# Patient Record
Sex: FEMALE | Race: WHITE | NOT HISPANIC OR LATINO | Employment: FULL TIME | URBAN - METROPOLITAN AREA
[De-identification: names, ages, dates, MRNs, and addresses within clinical notes are randomized per-mention and may not be internally consistent; named-entity substitution may affect disease eponyms.]

---

## 2024-02-28 ENCOUNTER — OFFICE VISIT (OUTPATIENT)
Dept: RADIATION ONCOLOGY | Facility: CLINIC | Age: 59
End: 2024-02-28
Payer: COMMERCIAL

## 2024-02-28 VITALS
DIASTOLIC BLOOD PRESSURE: 74 MMHG | WEIGHT: 164.44 LBS | SYSTOLIC BLOOD PRESSURE: 110 MMHG | RESPIRATION RATE: 18 BRPM | BODY MASS INDEX: 28.07 KG/M2 | HEART RATE: 75 BPM | TEMPERATURE: 98 F | OXYGEN SATURATION: 99 % | HEIGHT: 64 IN

## 2024-02-28 DIAGNOSIS — C79.49 SECONDARY MALIGNANT NEOPLASM OF BRAIN AND SPINAL CORD: Primary | ICD-10-CM

## 2024-02-28 DIAGNOSIS — C50.811 CANCER OF OVERLAPPING SITES OF RIGHT BREAST: ICD-10-CM

## 2024-02-28 DIAGNOSIS — C79.31 SECONDARY MALIGNANT NEOPLASM OF BRAIN AND SPINAL CORD: Primary | ICD-10-CM

## 2024-02-28 PROCEDURE — 99999 PR PBB SHADOW E&M-EST. PATIENT-LVL IV: CPT | Mod: PBBFAC,,, | Performed by: SPECIALIST

## 2024-02-28 PROCEDURE — 1159F MED LIST DOCD IN RCRD: CPT | Mod: CPTII,S$GLB,, | Performed by: SPECIALIST

## 2024-02-28 PROCEDURE — 3078F DIAST BP <80 MM HG: CPT | Mod: CPTII,S$GLB,, | Performed by: SPECIALIST

## 2024-02-28 PROCEDURE — 99205 OFFICE O/P NEW HI 60 MIN: CPT | Mod: S$GLB,,, | Performed by: SPECIALIST

## 2024-02-28 PROCEDURE — 3074F SYST BP LT 130 MM HG: CPT | Mod: CPTII,S$GLB,, | Performed by: SPECIALIST

## 2024-02-28 PROCEDURE — 3008F BODY MASS INDEX DOCD: CPT | Mod: CPTII,S$GLB,, | Performed by: SPECIALIST

## 2024-02-28 RX ORDER — DENOSUMAB 120 MG/1.7ML
120 INJECTION SUBCUTANEOUS
COMMUNITY

## 2024-02-28 RX ORDER — PREDNISONE 20 MG/1
20 TABLET ORAL EVERY MORNING
COMMUNITY
Start: 2023-11-08

## 2024-02-28 RX ORDER — PANTOPRAZOLE SODIUM 40 MG/1
40 TABLET, DELAYED RELEASE ORAL DAILY
COMMUNITY

## 2024-02-28 RX ORDER — ACETAMINOPHEN 500 MG
500 TABLET ORAL DAILY PRN
COMMUNITY

## 2024-02-28 RX ORDER — TEMAZEPAM 7.5 MG/1
7.5 CAPSULE ORAL NIGHTLY
COMMUNITY

## 2024-02-28 RX ORDER — FAM-TRASTUZUMAB DERUXTECAN-NXKI 100 MG/5ML
5.4 INJECTION, POWDER, LYOPHILIZED, FOR SOLUTION INTRAVENOUS
COMMUNITY

## 2024-02-28 RX ORDER — PROMETHAZINE HYDROCHLORIDE 12.5 MG/1
12.5 TABLET ORAL EVERY 6 HOURS PRN
COMMUNITY
Start: 2023-10-12

## 2024-02-28 NOTE — PROGRESS NOTES
[facility] / [facility] - Radiation Oncology Consult Note    Requesting physician:  Kim Abraham MD      HISTORY OF PRESENT ILLNESS:  Now 58-year-old woman, well known to me, with a history of right-sided breast cancer diagnosed in 2016, ultimately became widely metastatic and was controlled for years with systemic therapy, presenting to me with modest progression of her pulmonary nodules and development of a solitary cystic right-sided cerebellar metastasis that underwent gross total resection followed by fractionated stereotactic radiotherapy to 30 Gy in 5 fractions last on 04/12/2022.    Herceptin and Perjeta were ongoing with Dr. Abraham through our last follow up on 05/18/2022 when she remained entirely free of neurologic complaints or findings    I have requested interval records including all Radiology, but today she reports most recent restaging exam showed 2 new punctate foci in the brain, from which he is entirely asymptomatic, and some subtle progression in her subcentimeter thoracic disease.  Some manner of HER2 Beena therapy is ongoing, though Perjeta is no longer on board.  She reports tolerating her therapies without unexpected difficulty.  She is otherwise entirely without focal complaints      REVIEW OF SYSTEMS:  See above.  No focal complaints on multisystem review    PAST MEDICAL HISTORY:  Past Medical History:   Diagnosis Date    Anemia     Bone cancer     Brain cancer     Breast cancer     Depression     Lung cancer     Thyroid disease        PAST SURGICAL HISTORY:  Past Surgical History:   Procedure Laterality Date    BREAST LUMPECTOMY  2017    right breast    CRANIOTOMY FOR EXCISION OF INTRACRANIAL TUMOR  2022       ALLERGIES:   Review of patient's allergies indicates:   Allergen Reactions    Hydrocodone Nausea Only     Severe nausea    Codeine Nausea Only       MEDICATIONS:  Current Outpatient Medications   Medication Sig    acetaminophen (TYLENOL) 500 MG tablet  "Take 500 mg by mouth daily as needed.    denosumab (XGEVA) 120 mg/1.7 mL (70 mg/mL) Soln Inject 120 mg into the skin.    fam-trastuzumab deruxtecan-nxki (ENHERTU) 100 mg SolR Inject 5.4 mg/kg into the vein.    pantoprazole (PROTONIX) 40 MG tablet Take 40 mg by mouth once daily.    predniSONE (DELTASONE) 20 MG tablet Take 20 mg by mouth every morning.    promethazine (PHENERGAN) 12.5 MG Tab Take 12.5 mg by mouth every 6 (six) hours as needed.    temazepam (RESTORIL) 7.5 MG Cap Take 7.5 mg by mouth every evening.     No current facility-administered medications for this visit.       SOCIAL HISTORY:  Social History     Socioeconomic History    Marital status: Single   Tobacco Use    Smoking status: Never    Smokeless tobacco: Never   Substance and Sexual Activity    Alcohol use: Yes     Comment: occasionally       FAMILY HISTORY:  Family History   Problem Relation Age of Onset    Cancer Father     Breast cancer Paternal Grandmother     Breast cancer Paternal Great-Grandmother          PHYSICAL EXAMINATION:  Vitals:    02/28/24 0958   BP: 110/74   Pulse: 75   Resp: 18   Temp: 98 °F (36.7 °C)   SpO2: 99%   Weight: 74.6 kg (164 lb 7.4 oz)   Height: 5' 4" (1.626 m)     General:  A&O x4, NAD   Head and neck:  PERRLA, EOM intact, cranial nerves 2-12 intact   Neuro: Muscle strength is symmetric and appropriate throughout.  Deep tendon reflexes are symmetric and appropriate throughout.  She has normal rapid alternating hand movements and finger-nose with a negative Romberg and normal gait  Lymphatics: No cervical or supraclavicular adenopathy  Thoracic:  CTAB, RRR     KPS:  90    ASSESSMENT:  Longstanding history of metastatic breast cancer controlled with systemic therapy, with the exception of a solitary brain metastasis that underwent resection and fractionated adjuvant radiotherapy last on 04/12/2022.  Now with possible subtle systemic progression and MRI evidence of 2 new punctate foci in the brain, asymptomatic.  She " maintains an excellent performance status    PLAN:  I will obtain all of her interval records including films.  Assuming history is accurate and there are a pair of new brain metastases not in close proximity to her prior treatment, she is best treated with stereotactic radiosurgery.  We reviewed the logistics of that treatment, including the planning and treatment visits, as well as possible acute and chronic side effects, including increased risk if there is any overlap with her prior brain treatment.  She voiced an understanding and a desire to proceed.  Informed written consent was obtained and she was given the original after scanning into the EMR.    She will return next week after I have had opportunity to review her films.  She will undergo stereotactic immobilization and CT simulation and begin treatment about 1 week later, with final dose and fractionation to be determined following radiographic review, likely 27/3/9.0 for each    Addendum to 09/20/2024 :  I have reviewed her MRI from 02/20/2024 and compared it to her scan from July 2023 though contrasted sagittal images were not sent over by the General for either of the most recent prior MRIs, and the abnormalities are best visualized on those films..  I agree with the written report that there are 2 new right cerebellar enhancing nodules worrisome for recurrence.  A left nicholas hemangioma has remained stable over time.  I am unable to attach relevant images    I have also reviewed CT of the chest abdomen and pelvis on 02/20/2024 and agree that there is subtle enlargement of a 4 mm left lower lobe pulmonary nodule up to 6 mm, with the remaining pulmonary nodules stable and no new nodules.  Stable posterior left renal enhancing mass      I spent approximately 60 minutes reviewing the available records and evaluating the patient, out of which over 50% of the time was spent face to face with the patient in counseling and coordinating this patient's care.

## 2024-02-29 ENCOUNTER — DOCUMENTATION ONLY (OUTPATIENT)
Dept: RADIATION ONCOLOGY | Facility: CLINIC | Age: 59
End: 2024-02-29
Payer: COMMERCIAL

## 2024-03-01 ENCOUNTER — HOSPITAL ENCOUNTER (OUTPATIENT)
Dept: RADIATION THERAPY | Facility: HOSPITAL | Age: 59
Discharge: HOME | End: 2024-03-01
Attending: SPECIALIST
Payer: COMMERCIAL

## 2024-03-06 ENCOUNTER — HOSPITAL ENCOUNTER (OUTPATIENT)
Dept: RADIATION THERAPY | Facility: HOSPITAL | Age: 59
Discharge: HOME | End: 2024-03-06
Attending: SPECIALIST
Payer: COMMERCIAL

## 2024-03-06 ENCOUNTER — HOSPITAL ENCOUNTER (OUTPATIENT)
Dept: RADIOLOGY | Facility: HOSPITAL | Age: 59
Discharge: HOME | End: 2024-03-06
Attending: SPECIALIST
Payer: COMMERCIAL

## 2024-03-06 DIAGNOSIS — C79.49 SECONDARY MALIGNANT NEOPLASM OF BRAIN AND SPINAL CORD: Primary | ICD-10-CM

## 2024-03-06 DIAGNOSIS — C79.31 SECONDARY MALIGNANT NEOPLASM OF BRAIN AND SPINAL CORD: Primary | ICD-10-CM

## 2024-03-06 PROCEDURE — 77334 RADIATION TREATMENT AID(S): CPT | Mod: TC | Performed by: SPECIALIST

## 2024-03-06 PROCEDURE — 77263 THER RADIOLOGY TX PLNG CPLX: CPT | Mod: ,,, | Performed by: SPECIALIST

## 2024-03-06 PROCEDURE — 77014 PR  CT GUIDANCE PLACEMENT RAD THERAPY FIELDS: CPT | Mod: 26,,, | Performed by: SPECIALIST

## 2024-03-06 PROCEDURE — 77334 RADIATION TREATMENT AID(S): CPT | Mod: 26,,, | Performed by: SPECIALIST

## 2024-03-06 PROCEDURE — 77014 HC CT GUIDANCE RADIATION THERAPY FLDS PLACEMENT: CPT | Mod: TC | Performed by: SPECIALIST

## 2024-03-12 ENCOUNTER — HOSPITAL ENCOUNTER (OUTPATIENT)
Dept: RADIOLOGY | Facility: HOSPITAL | Age: 59
Discharge: HOME | End: 2024-03-12
Attending: SPECIALIST
Payer: COMMERCIAL

## 2024-03-12 DIAGNOSIS — C79.31 SECONDARY MALIGNANT NEOPLASM OF BRAIN AND SPINAL CORD: ICD-10-CM

## 2024-03-12 DIAGNOSIS — C79.49 SECONDARY MALIGNANT NEOPLASM OF BRAIN AND SPINAL CORD: ICD-10-CM

## 2024-03-12 PROCEDURE — 70553 MRI BRAIN STEM W/O & W/DYE: CPT | Mod: TC

## 2024-03-12 PROCEDURE — 25500020 PHARM REV CODE 255: Performed by: SPECIALIST

## 2024-03-12 PROCEDURE — 70553 MRI BRAIN STEM W/O & W/DYE: CPT | Mod: 26,,, | Performed by: RADIOLOGY

## 2024-03-12 PROCEDURE — A9585 GADOBUTROL INJECTION: HCPCS | Performed by: SPECIALIST

## 2024-03-12 RX ORDER — GADOBUTROL 604.72 MG/ML
10 INJECTION INTRAVENOUS
Status: COMPLETED | OUTPATIENT
Start: 2024-03-12 | End: 2024-03-12

## 2024-03-12 RX ADMIN — GADOBUTROL 7 ML: 604.72 INJECTION INTRAVENOUS at 06:03

## 2024-03-14 PROCEDURE — 77301 RADIOTHERAPY DOSE PLAN IMRT: CPT | Mod: 26,,, | Performed by: SPECIALIST

## 2024-03-14 PROCEDURE — 77301 RADIOTHERAPY DOSE PLAN IMRT: CPT | Mod: TC | Performed by: SPECIALIST

## 2024-03-18 PROCEDURE — 77370 RADIATION PHYSICS CONSULT: CPT | Performed by: SPECIALIST

## 2024-03-19 PROCEDURE — 77470 SPECIAL RADIATION TREATMENT: CPT | Mod: 59,TC | Performed by: SPECIALIST

## 2024-03-19 PROCEDURE — 77338 DESIGN MLC DEVICE FOR IMRT: CPT | Mod: 26,,, | Performed by: SPECIALIST

## 2024-03-19 PROCEDURE — 77300 RADIATION THERAPY DOSE PLAN: CPT | Mod: TC | Performed by: SPECIALIST

## 2024-03-19 PROCEDURE — 77370 RADIATION PHYSICS CONSULT: CPT | Performed by: SPECIALIST

## 2024-03-19 PROCEDURE — 77300 RADIATION THERAPY DOSE PLAN: CPT | Mod: 26,,, | Performed by: SPECIALIST

## 2024-03-19 PROCEDURE — 77338 DESIGN MLC DEVICE FOR IMRT: CPT | Mod: TC | Performed by: SPECIALIST

## 2024-03-19 PROCEDURE — 77470 SPECIAL RADIATION TREATMENT: CPT | Mod: 26,59,, | Performed by: SPECIALIST

## 2024-03-20 ENCOUNTER — DOCUMENTATION ONLY (OUTPATIENT)
Dept: RADIATION ONCOLOGY | Facility: CLINIC | Age: 59
End: 2024-03-20
Payer: COMMERCIAL

## 2024-03-20 PROCEDURE — 77014 HC CT GUIDANCE RADIATION THERAPY FLDS PLACEMENT: CPT | Mod: TC | Performed by: SPECIALIST

## 2024-03-20 PROCEDURE — 77435 SBRT MANAGEMENT: CPT | Mod: ,,, | Performed by: SPECIALIST

## 2024-03-20 PROCEDURE — 77014 PR  CT GUIDANCE PLACEMENT RAD THERAPY FIELDS: CPT | Mod: 26,,, | Performed by: SPECIALIST

## 2024-03-20 PROCEDURE — 77373 STRTCTC BDY RAD THER TX DLVR: CPT | Performed by: SPECIALIST

## 2024-03-20 NOTE — PLAN OF CARE
Day 1 outpatient xrt to the brain. Brain handout and verbal instructions given. Skin care and side effects reviewed. Contact info provided. Patient verbalized understanding.

## 2024-03-21 ENCOUNTER — DOCUMENTATION ONLY (OUTPATIENT)
Dept: RADIATION ONCOLOGY | Facility: CLINIC | Age: 59
End: 2024-03-21
Payer: COMMERCIAL

## 2024-03-21 PROCEDURE — 77373 STRTCTC BDY RAD THER TX DLVR: CPT | Performed by: SPECIALIST

## 2024-03-21 PROCEDURE — 77014 PR  CT GUIDANCE PLACEMENT RAD THERAPY FIELDS: CPT | Mod: 26,,, | Performed by: SPECIALIST

## 2024-03-21 NOTE — PLAN OF CARE
Day 2 outpatient xrt to the brain. Tolerating therapy well without complaint at this time. Will continue to monitor.

## 2024-03-22 ENCOUNTER — DOCUMENTATION ONLY (OUTPATIENT)
Dept: RADIATION ONCOLOGY | Facility: CLINIC | Age: 59
End: 2024-03-22
Payer: COMMERCIAL

## 2024-03-22 PROCEDURE — 77373 STRTCTC BDY RAD THER TX DLVR: CPT | Performed by: SPECIALIST

## 2024-03-22 PROCEDURE — 77014 PR  CT GUIDANCE PLACEMENT RAD THERAPY FIELDS: CPT | Mod: 26,,, | Performed by: SPECIALIST

## 2024-03-26 PROCEDURE — 77336 RADIATION PHYSICS CONSULT: CPT | Performed by: SPECIALIST

## 2024-04-25 ENCOUNTER — TELEPHONE (OUTPATIENT)
Dept: RADIATION ONCOLOGY | Facility: CLINIC | Age: 59
End: 2024-04-25
Payer: COMMERCIAL

## 2024-04-25 NOTE — TELEPHONE ENCOUNTER
Made call to r/s cancelled f/u appt with Dr Ayala in radiation Oncology. No answer, LVM and call back number.

## 2024-05-16 ENCOUNTER — TELEPHONE (OUTPATIENT)
Dept: RADIATION ONCOLOGY | Facility: CLINIC | Age: 59
End: 2024-05-16
Payer: COMMERCIAL

## 2024-05-17 ENCOUNTER — TELEPHONE (OUTPATIENT)
Dept: RADIATION ONCOLOGY | Facility: CLINIC | Age: 59
End: 2024-05-17
Payer: COMMERCIAL

## 2024-05-17 NOTE — TELEPHONE ENCOUNTER
Tried calling patient again to r/s missed f/u appt. No answer, LVM and call back number on both numbers listed in chart.

## 2024-05-22 ENCOUNTER — OFFICE VISIT (OUTPATIENT)
Dept: RADIATION ONCOLOGY | Facility: CLINIC | Age: 59
End: 2024-05-22
Payer: COMMERCIAL

## 2024-05-22 VITALS
DIASTOLIC BLOOD PRESSURE: 58 MMHG | SYSTOLIC BLOOD PRESSURE: 110 MMHG | TEMPERATURE: 97 F | BODY MASS INDEX: 28.23 KG/M2 | OXYGEN SATURATION: 99 % | HEART RATE: 69 BPM | WEIGHT: 164.44 LBS | RESPIRATION RATE: 18 BRPM

## 2024-05-22 DIAGNOSIS — C50.811 CANCER OF OVERLAPPING SITES OF RIGHT BREAST: ICD-10-CM

## 2024-05-22 DIAGNOSIS — C79.49 SECONDARY MALIGNANT NEOPLASM OF BRAIN AND SPINAL CORD: Primary | ICD-10-CM

## 2024-05-22 DIAGNOSIS — C79.31 SECONDARY MALIGNANT NEOPLASM OF BRAIN AND SPINAL CORD: Primary | ICD-10-CM

## 2024-05-22 PROCEDURE — 99213 OFFICE O/P EST LOW 20 MIN: CPT | Mod: S$GLB,,, | Performed by: SPECIALIST

## 2024-05-22 PROCEDURE — 3074F SYST BP LT 130 MM HG: CPT | Mod: CPTII,S$GLB,, | Performed by: SPECIALIST

## 2024-05-22 PROCEDURE — 3008F BODY MASS INDEX DOCD: CPT | Mod: CPTII,S$GLB,, | Performed by: SPECIALIST

## 2024-05-22 PROCEDURE — 99999 PR PBB SHADOW E&M-EST. PATIENT-LVL IV: CPT | Mod: PBBFAC,,, | Performed by: SPECIALIST

## 2024-05-22 PROCEDURE — 3078F DIAST BP <80 MM HG: CPT | Mod: CPTII,S$GLB,, | Performed by: SPECIALIST

## 2024-05-22 PROCEDURE — 1159F MED LIST DOCD IN RCRD: CPT | Mod: CPTII,S$GLB,, | Performed by: SPECIALIST

## 2024-05-22 RX ORDER — LEVOTHYROXINE SODIUM 25 UG/1
25 TABLET ORAL
COMMUNITY
Start: 2024-04-06

## 2024-05-22 NOTE — PROGRESS NOTES
[facility] / [facility] - Radiation Oncology Follow Up Note       REFERRING PHYSICIAN: : Kim Abraham MD     DIAGNOSIS: C79.31 - Secondary malignant neoplasm of brain, Diagnosed 2/28/2024 (Active)     Longstanding history of metastatic breast cancer controlled with systemic therapy, with the exception of a solitary brain metastasis that underwent resection and fractionated adjuvant radiotherapy last on 04/12/2022.     She then developed 3 new right cerebellar asymptomatic enhancing metastases. She maintained an excellent performance status.      All 3 lesions were treated using a single isodose plan delivering 27 Gy in 3 9.0 Gy fractions using image guided VMAT stereotactic techniques from 03/20/2024 to 03/22/2024          INTERVAL HISTORY:  She returns for routine follow up after having missed her short interval one-month follow-up.  She remains entirely without complaints related to her therapy or intracranial disease.  She has no other new complaints.    She underwent brain MRI on 05/13/2024 which I have reviewed today.  There are no new findings.  The treated lesions, as shown, are stable to modestly more prominent.  The report describes the 1st lesion below as new, but it was compared to a non-stealth MRI from 02/20/2024.  This lesion was 1 of the 3 that were recently treated.        5/13/24     3/12/24                  She also underwent CT of the chest abdomen and pelvis, by report, with persistent freedom from subdiaphragmatic disease.  She has multiple subcentimeter bilateral pulmonary nodules that appear stable, with the exception of a single left upper lobe nodule increasing from 4 up to 8 mm.  There were no new findings    EnHERtu is underway with Dr. Abraham    PHYSICAL EXAMINATION:  Vitals:    05/22/24 0846   BP: (!) 110/58   Pulse: 69   Resp: 18   Temp: 97.3 °F (36.3 °C)   SpO2: 99%   Weight: 74.6 kg (164 lb 7.4 oz)      General:  A&O x4, NAD  HEENT:  SERENITY YOU II-XII  intact, EOM intact,   Neuro:  Muscle strength is symmetric and appropriate throughout.  Deep tendon reflexes are symmetric and appropriate throughout.  She has a normal gait  Lymphatics:  no cervical/sclav LAD  Lungs:  CTAB  Heart:  RRR  Abdomen:  NTND       ASSESSMENT:  Clinically stable.  Questionable progression of 2 of her recently treated trio of lesions versus posttherapeutic change versus radionecrosis.      PLAN:  I recommend continued observation.  I have ordered a stealth MRI in 2 months She maintains frequent follow up with Dr. Abraham where radiographic monitoring is otherwise performed

## 2024-07-24 ENCOUNTER — HOSPITAL ENCOUNTER (OUTPATIENT)
Dept: RADIOLOGY | Facility: HOSPITAL | Age: 59
Discharge: HOME | End: 2024-07-24
Attending: SPECIALIST
Payer: COMMERCIAL

## 2024-07-24 DIAGNOSIS — C50.811 CANCER OF OVERLAPPING SITES OF RIGHT BREAST: ICD-10-CM

## 2024-07-24 DIAGNOSIS — C79.31 SECONDARY MALIGNANT NEOPLASM OF BRAIN AND SPINAL CORD: ICD-10-CM

## 2024-07-24 DIAGNOSIS — C79.49 SECONDARY MALIGNANT NEOPLASM OF BRAIN AND SPINAL CORD: ICD-10-CM

## 2024-07-24 PROCEDURE — 25500020 PHARM REV CODE 255: Performed by: SPECIALIST

## 2024-07-24 PROCEDURE — A9585 GADOBUTROL INJECTION: HCPCS | Performed by: SPECIALIST

## 2024-07-24 PROCEDURE — 70552 MRI BRAIN STEM W/DYE: CPT | Mod: 26,,, | Performed by: RADIOLOGY

## 2024-07-24 PROCEDURE — 70552 MRI BRAIN STEM W/DYE: CPT | Mod: TC

## 2024-07-24 RX ORDER — GADOBUTROL 604.72 MG/ML
10 INJECTION INTRAVENOUS
Status: COMPLETED | OUTPATIENT
Start: 2024-07-24 | End: 2024-07-24

## 2024-07-24 RX ADMIN — GADOBUTROL 7 ML: 604.72 INJECTION INTRAVENOUS at 11:07

## 2024-07-31 ENCOUNTER — OFFICE VISIT (OUTPATIENT)
Dept: RADIATION ONCOLOGY | Facility: CLINIC | Age: 59
End: 2024-07-31
Payer: COMMERCIAL

## 2024-07-31 VITALS
HEIGHT: 64 IN | HEART RATE: 80 BPM | WEIGHT: 163.38 LBS | TEMPERATURE: 97 F | SYSTOLIC BLOOD PRESSURE: 93 MMHG | BODY MASS INDEX: 27.89 KG/M2 | RESPIRATION RATE: 18 BRPM | OXYGEN SATURATION: 99 % | DIASTOLIC BLOOD PRESSURE: 61 MMHG

## 2024-07-31 DIAGNOSIS — C79.31 SECONDARY MALIGNANT NEOPLASM OF BRAIN AND SPINAL CORD: Primary | ICD-10-CM

## 2024-07-31 DIAGNOSIS — C50.811 CANCER OF OVERLAPPING SITES OF RIGHT BREAST: ICD-10-CM

## 2024-07-31 DIAGNOSIS — C79.49 SECONDARY MALIGNANT NEOPLASM OF BRAIN AND SPINAL CORD: Primary | ICD-10-CM

## 2024-07-31 PROCEDURE — 99999 PR PBB SHADOW E&M-EST. PATIENT-LVL IV: CPT | Mod: PBBFAC,,, | Performed by: SPECIALIST

## 2024-07-31 RX ORDER — AZELASTINE 1 MG/ML
1 SPRAY, METERED NASAL
COMMUNITY
Start: 2024-06-24

## 2024-07-31 NOTE — PROGRESS NOTES
[facility] - Radiation Oncology Follow Up Note    REFERRING PHYSICIAN: : Kim Abraham MD      DIAGNOSIS: C79.31 - Secondary malignant neoplasm of brain, Diagnosed 2/28/2024 (Active)      Longstanding history of metastatic breast cancer controlled with systemic therapy, with the exception of a solitary brain metastasis that underwent resection and fractionated adjuvant radiotherapy last on 04/12/2022.      She then developed 3 new right cerebellar asymptomatic enhancing metastases. She maintained an excellent performance status.       All 3 lesions were treated using a single isodose plan delivering 27 Gy in 3 9.0 Gy fractions using image guided VMAT stereotactic techniques from 03/20/2024 to 03/22/2024        MRI on 05/13/2024 no new findings.  The treated lesions, as shown, are stable to modestly more prominent.  The report describes the 1st lesion below as new, but it was compared to a non-stealth MRI from 02/20/2024.  This lesion was 1 of the 3 that were recently treated.                                5/13/24                                                            3/12/24                       She also underwent CT of the chest abdomen and pelvis, by report, with persistent freedom from subdiaphragmatic disease.  She has multiple subcentimeter bilateral pulmonary nodules that appear stable, with the exception of a single left upper lobe nodule increasing from 4 up to 8 mm.  There were no new findings     EnHERtu is underway with Dr. Abraham    INTERVAL HISTORY:  She returns for routine three-month follow up.  She has no new complaints related to therapy are worrisome for local regional or metastatic recurrence.  She describes occasional headache not requiring intervention.  She specifically denies ataxia, focal motor or sensory deficits, or vision change.  She continues to work full-time without deficits    MRI on 07/24/2024, which I have reviewed, by report shows  "increasing vasogenic edema and increased size of all 3 previously treated lesions now measuring 0.88 by 1.21 cm, 0.7 cm, and 0.49 cm..  On my review, I am pleased to see that the edema is not very impressive, consistent with her review of systems.  As seen below, 2 of the 3 treated lesions have shown modest change over time, consistent with post radiotherapeutic change.  However the 1st lesion shown does demonstrate a more concerning change, most consistent with radiation necrosis.      07/24/2024 05/13/2024 03/12/2024, pretreatment                  She does report recent restaging at the [facility] demonstrates progressive thoracic disease.  She currently remains on in enHERtu        PHYSICAL EXAMINATION:  Vitals:    07/31/24 0952   BP: 93/61   Pulse: 80   Resp: 18   Temp: 97.2 °F (36.2 °C)   SpO2: 99%   Weight: 74.1 kg (163 lb 5.8 oz)   Height: 5' 4" (1.626 m)      General:  A&O x4, NAD, she nimbly scales exam table without assistance  HEENT:  PEERLA, CN II-XII intact, EOM intact,   Neuro:  Muscle strength is symmetric and appropriate throughout.  Deep tendon reflexes are symmetric and appropriate throughout.  She has a normal rapid alternating hand movements and finger-nose with a negative Romberg and normal gait.  Lymphatics:  no cervical/sclav LAD  Lungs:  CTAB  Heart:  RRR  Abdomen:  NTND +BS, no HSM      ASSESSMENT AND PLAN:  Intracranial changes as described above, progressive disease versus radiation change/necrosis, with the latter favored.  Continues to do extremely well clinically including working full-time and without neurologic deficit.    She does describe recent imaging showing progressive thoracic disease and she meets with Dr. Abraham on Friday to discuss available options.  I have requested those films    I will discuss with Dr. 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    She will return to my clinic in 3 months with MRI " of the brain

## 2024-10-30 ENCOUNTER — HOSPITAL ENCOUNTER (OUTPATIENT)
Dept: RADIOLOGY | Facility: HOSPITAL | Age: 59
Discharge: HOME | End: 2024-10-30
Attending: SPECIALIST
Payer: COMMERCIAL

## 2024-10-30 DIAGNOSIS — C79.49 SECONDARY MALIGNANT NEOPLASM OF BRAIN AND SPINAL CORD: ICD-10-CM

## 2024-10-30 DIAGNOSIS — C79.31 SECONDARY MALIGNANT NEOPLASM OF BRAIN AND SPINAL CORD: ICD-10-CM

## 2024-10-30 PROCEDURE — 70552 MRI BRAIN STEM W/DYE: CPT | Mod: 26,,, | Performed by: RADIOLOGY

## 2024-10-30 PROCEDURE — A9585 GADOBUTROL INJECTION: HCPCS | Performed by: SPECIALIST

## 2024-10-30 PROCEDURE — 25500020 PHARM REV CODE 255: Performed by: SPECIALIST

## 2024-10-30 PROCEDURE — 70552 MRI BRAIN STEM W/DYE: CPT | Mod: TC

## 2024-10-30 RX ORDER — GADOBUTROL 604.72 MG/ML
10 INJECTION INTRAVENOUS
Status: COMPLETED | OUTPATIENT
Start: 2024-10-30 | End: 2024-10-30

## 2024-10-30 RX ADMIN — GADOBUTROL 7 ML: 604.72 INJECTION INTRAVENOUS at 10:10

## 2024-11-08 ENCOUNTER — OFFICE VISIT (OUTPATIENT)
Dept: RADIATION ONCOLOGY | Facility: CLINIC | Age: 59
End: 2024-11-08
Payer: COMMERCIAL

## 2024-11-08 DIAGNOSIS — C79.31 SECONDARY MALIGNANT NEOPLASM OF BRAIN AND SPINAL CORD: Primary | ICD-10-CM

## 2024-11-08 DIAGNOSIS — C79.49 SECONDARY MALIGNANT NEOPLASM OF BRAIN AND SPINAL CORD: Primary | ICD-10-CM

## 2024-11-08 DIAGNOSIS — C50.811 CANCER OF OVERLAPPING SITES OF RIGHT BREAST: ICD-10-CM

## 2024-11-08 NOTE — PROGRESS NOTES
[facility] - Radiation Oncology TELEMEDICINE Follow Up Note    REFERRING PHYSICIAN: : Kim Abraham MD      DIAGNOSIS: C79.31 - Secondary malignant neoplasm of brain, Diagnosed 2/28/2024 (Active)      Longstanding history of metastatic breast cancer controlled with systemic therapy, with the exception of a solitary brain metastasis that underwent resection and fractionated adjuvant radiotherapy last on 04/12/2022.      She then developed 3 new right cerebellar asymptomatic enhancing metastases. She maintained an excellent performance status.       All 3 lesions were treated using a single isodose plan delivering 27 Gy in 3 9.0 Gy fractions using image guided VMAT stereotactic techniques from 03/20/2024 to 03/22/2024        MRI on 05/13/2024 no new findings.  The treated lesions, as shown, are stable to modestly more prominent.  The report describes the 1st lesion below as new, but it was compared to a non-stealth MRI from 02/20/2024.  This lesion was 1 of the 3 that were recently treated.                                5/13/24                                                            3/12/24                       She also underwent CT of the chest abdomen and pelvis, by report, with persistent freedom from subdiaphragmatic disease.  She has multiple subcentimeter bilateral pulmonary nodules that appear stable, with the exception of a single left upper lobe nodule increasing from 4 up to 8 mm.  There were no new findings     EnHERtu is underway with Dr. Abraham     07/31/2024:  She returns for routine three-month follow up.  She has no new complaints related to therapy are worrisome for local regional or metastatic recurrence.  She describes occasional headache not requiring intervention.  She specifically denies ataxia, focal motor or sensory deficits, or vision change.  She continues to work full-time without deficits     MRI on 07/24/2024, which I have reviewed, by report  shows increasing vasogenic edema and increased size of all 3 previously treated lesions now measuring 0.88 by 1.21 cm, 0.7 cm, and 0.49 cm..  On my review, I am pleased to see that the edema is not very impressive, consistent with her review of systems.  As seen below, 2 of the 3 treated lesions have shown modest change over time, consistent with post radiotherapeutic change.  However the 1st lesion shown does demonstrate a more concerning change, most consistent with radiation necrosis.        07/24/2024 05/13/2024 03/12/2024, pretreatment                       She does report recent restaging at the [facility] demonstrates progressive thoracic disease.  She currently remains on in [facility]         INTERVAL HISTORY:  We are meeting today via zoom.    She reports having maintained normal neuro function in the interval.  She reports having  gone on to receive Avastin which was well tolerated.  Stealth MRI of the brain on 10/30/2024, which I have reviewed today, shows decrease in the size and degree of enhancement in the previously identified right cerebellar lesions, with the larger and most concerning of those now measuring 1.0 x 0.6 cm, previously 1.2 x 0.9 cm as shown below only tiny foci of enhancement remain of the other 2 sites        She reports interval PET shows a mixed response and I have requested those films and report    It unfortunately also showed a right colon lesion which has been worked up and biopsied, found to be a primary colon cancer.  Surgical excision is scheduled for 11/20/2024 with Dr. Talley    PHYSICAL EXAMINATION:  There were no vitals filed for this visit.   General:  A&O x4, NAD      ASSESSMENT:  Interval intracranial response to Avastin suggesting previous worsening of intracranial lesions most consistent with post radiotherapeutic change.  No new malignant findings there.  Mixed interval response in the chest by patient  history, with unfortunate interval diagnosis of primary right colon cancer scheduled for resection on 11/20/2024      PLAN:  No role for radiotherapy at this time.  Follow up here in 6 months.  I strongly encouraged her to continue regular follow up with Dr. Abraham.